# Patient Record
Sex: FEMALE | Race: WHITE | NOT HISPANIC OR LATINO | URBAN - METROPOLITAN AREA
[De-identification: names, ages, dates, MRNs, and addresses within clinical notes are randomized per-mention and may not be internally consistent; named-entity substitution may affect disease eponyms.]

---

## 2022-12-17 ENCOUNTER — EMERGENCY (EMERGENCY)
Facility: HOSPITAL | Age: 27
LOS: 0 days | Discharge: HOME | End: 2022-12-17
Attending: STUDENT IN AN ORGANIZED HEALTH CARE EDUCATION/TRAINING PROGRAM | Admitting: STUDENT IN AN ORGANIZED HEALTH CARE EDUCATION/TRAINING PROGRAM

## 2022-12-17 VITALS
RESPIRATION RATE: 18 BRPM | SYSTOLIC BLOOD PRESSURE: 122 MMHG | WEIGHT: 145.06 LBS | TEMPERATURE: 98 F | DIASTOLIC BLOOD PRESSURE: 80 MMHG | OXYGEN SATURATION: 99 % | HEART RATE: 74 BPM
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
## 2022-12-17 DIAGNOSIS — T31.0 BURNS INVOLVING LESS THAN 10% OF BODY SURFACE: ICD-10-CM
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
## 2022-12-17 DIAGNOSIS — T23.202A BURN OF SECOND DEGREE OF LEFT HAND, UNSPECIFIED SITE, INITIAL ENCOUNTER: ICD-10-CM

## 2022-12-17 PROCEDURE — 99284 EMERGENCY DEPT VISIT MOD MDM: CPT

## 2022-12-17 PROCEDURE — 99053 MED SERV 10PM-8AM 24 HR FAC: CPT

## 2022-12-17 RX ORDER — IBUPROFEN 200 MG
400 TABLET ORAL ONCE
Refills: 0 | Status: COMPLETED | OUTPATIENT
Start: 2022-12-17 | End: 2022-12-17

## 2022-12-17 RX ORDER — IBUPROFEN 200 MG
1 TABLET ORAL
Qty: 42 | Refills: 0
Start: 2022-12-17 | End: 2022-12-30

## 2022-12-17 RX ADMIN — Medication 400 MILLIGRAM(S): at 02:23

## 2022-12-17 NOTE — ED PROVIDER NOTE - CLINICAL SUMMARY MEDICAL DECISION MAKING FREE TEXT BOX
pt seen by gregor allan. burn will send ssd cream. burn does not recommend admission. burn states pt can f/u Tuesday.

## 2022-12-17 NOTE — ED PROVIDER NOTE - OBJECTIVE STATEMENT
26 yo f no pmh  pt presents for eval of L hand burn. 2 days ago, pt spilled boiling water to hand. pt has burn ~3 inches x1 inch wrapping 2nd digit carpal region dorsal around to little region. + blister. no fever/chills/redness. pt transferred from [facility] for burn eval. pt R hand dom. tdap given at OSH

## 2022-12-17 NOTE — ED ADULT NURSE NOTE - NSIMPLEMENTINTERV_GEN_ALL_ED
Implemented All Universal Safety Interventions:  [place] to call system. Call bell, personal items and telephone within reach. Instruct patient to call for assistance. Room bathroom lighting operational. Non-slip footwear when patient is off stretcher. Physically safe environment: no spills, clutter or unnecessary equipment. Stretcher in lowest position, wheels locked, appropriate side rails in place.

## 2022-12-17 NOTE — CONSULT NOTE ADULT - SUBJECTIVE AND OBJECTIVE BOX
Pt is 28 y/o female with no significant PMHx sent from [facility] ED for evaluation of left hand burn. Pt states  she works as  is [employer]. She was using steamer at her work anf accidentally got burn to her left hand two   days. Initially, she did not go to see a doctor but today noted with large blister and worsening pain and went to [place] ED for evaluation. And then pt transferred to [facility] ED to see BURN specialist. Pt denies fever, chills, cough, chest   pain, headache, abdominal pain, nausea, or vomiting.         Vital Signs Last 24 Hrs  T(C): 36.4 (17 Dec 2022 00:59), Max: 36.4 (17 Dec 2022 00:59)  T(F): 97.6 (17 Dec 2022 00:59), Max: 97.6 (17 Dec 2022 00:59)  HR: 74 (17 Dec 2022 00:59) (74 - 74)  BP: 122/80 (17 Dec 2022 00:59) (122/80 - 122/80)  RR: 18 (17 Dec 2022 00:59) (18 - 18)  SpO2: 99% (17 Dec 2022 00:59) (99% - 99%)      Allergies  No Known Allergies      PHYSICAL EXAM:  GENERAL: well built, well nourished, NAD, alert, oriented, cooperative  SKIN/Wound: left hand second degree burn with large 3inches x 1 inch blister full of fluid at base of 1st and second digit   extending to palmar surface. Pt able to ROM all fingers, no streaking redness, no signs of infection. Excisional   debridement of  blister deep to dermis done on bedside. Then dressing with silvadene, adaptic, and kelrix applied. Pt   tolerated well.  Pt is 26 y/o female with no significant PMHx sent from [facility] ED for evaluation of left hand burn. Pt states she works as  is [employer]. She was using steamer at her work anf accidentally got burn to her left hand two days. Initially, she did not go to see a doctor but today noted with large blister and worsening pain and went to [place] ED for evaluation. And then pt transferred to [facility] ED to see BURN specialist. Pt denies fever, chills, cough, chest pain, headache, abdominal pain, nausea, or vomiting     Vital Signs Last 24 Hrs  T(C): 36.4 (17 Dec 2022 00:59), Max: 36.4 (17 Dec 2022 00:59)  T(F): 97.6 (17 Dec 2022 00:59), Max: 97.6 (17 Dec 2022 00:59)  HR: 74 (17 Dec 2022 00:59) (74 - 74)  BP: 122/80 (17 Dec 2022 00:59) (122/80 - 122/80)  RR: 18 (17 Dec 2022 00:59) (18 - 18)  SpO2: 99% (17 Dec 2022 00:59) (99% - 99%)      Allergies  No Known Allergies      PHYSICAL EXAM:  GENERAL: well built, well nourished, NAD, alert, oriented, cooperative  SKIN/Wound: left hand second degree burn with large 3inches x 1 inch blister full of fluid at base of 1st and second digit   extending to palmar surface. Pt able to ROM all fingers, no streaking redness, no signs of infection. Excisional   debridement of  blister deep to dermis done on bedside. Then dressing with silvadene, adaptic, and kelrix applied. Pt   tolerated well.  Pt is 26 y/o female with no significant PMHx sent from [facility] ED for evaluation of left hand burn. Pt states she works as  is [employer]. She was using steamer at her work anf accidentally got burn to her left hand two days. Initially, she did not go to see a doctor but today noted with large blister and worsening pain and went to [place] ED for evaluation. And then pt transferred to [facility] ED to see BURN specialist. Pt denies fever, chills, cough, chest pain, headache, abdominal pain, nausea, or vomiting     Vital Signs Last 24 Hrs  T(C): 36.4 (17 Dec 2022 00:59), Max: 36.4 (17 Dec 2022 00:59)  T(F): 97.6 (17 Dec 2022 00:59), Max: 97.6 (17 Dec 2022 00:59)  HR: 74 (17 Dec 2022 00:59) (74 - 74)  BP: 122/80 (17 Dec 2022 00:59) (122/80 - 122/80)  RR: 18 (17 Dec 2022 00:59) (18 - 18)  SpO2: 99% (17 Dec 2022 00:59) (99% - 99%)      Allergies  No Known Allergies      PHYSICAL EXAM:  GENERAL: well built, well nourished, NAD, alert, oriented, cooperative  SKIN/Wound: left hand second degree burn with large 3inches x 1 inch blister full of fluid at base of 1st and second digit extending to palmar surface. Pt able to ROM all fingers, no streaking redness, no signs of infection. Excisional debridement of  blister deep to dermis done on bedside. Then dressing with silvadene, adaptic, and kelrix applied. Pt tolerated wel.  l.  Pt is 28 y/o female with no significant PMHx sent from [facility] ED for evaluation of left hand burn. 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 She was using steamer at her work anf accidentally got burn to her left hand two days. Initially, she did not go to see a doctor but today noted with large blister and worsening pain and went to [place] ED for evaluation. And then pt transferred to [facility] ED to see BURN specialist. Pt denies fever, chills, cough, chest pain, headache, abdominal pain, nausea, or vomiting     Vital Signs Last 24 Hrs  T(C): 36.4 (17 Dec 2022 00:59), Max: 36.4 (17 Dec 2022 00:59)  T(F): 97.6 (17 Dec 2022 00:59), Max: 97.6 (17 Dec 2022 00:59)  HR: 74 (17 Dec 2022 00:59) (74 - 74)  BP: 122/80 (17 Dec 2022 00:59) (122/80 - 122/80)  RR: 18 (17 Dec 2022 00:59) (18 - 18)  SpO2: 99% (17 Dec 2022 00:59) (99% - 99%)      Allergies  No Known Allergies      PHYSICAL EXAM:  GENERAL: well built, well nourished, NAD, alert, oriented, cooperative  SKIN/Wound: left hand second degree burn with large 3inches x 1 inch blister full of fluid at base of 1st and second digit extending to palmar surface. Pt able to ROM all fingers, no streaking redness, no signs of infection. Excisional debridement of  blister deep to dermis done on bedside. Then dressing with silvadene, adaptic, and kelrix applied. Pt tolerated well.

## 2022-12-17 NOTE — ED ADULT NURSE NOTE - CHIEF COMPLAINT QUOTE
Pt was sent from [facility] for burn eval to the left hand that happened on Wednesday by spilling boiling water.

## 2022-12-17 NOTE — ED PROVIDER NOTE - NSFOLLOWUPCLINICS_GEN_ALL_ED_FT
[facility]-Cardiac [facility] Lower Level  Burn  [address] [place] [address]  Phone: [phone]  Fax:   Scheduled Appointment: 12/20/2022

## 2022-12-17 NOTE — ED PROVIDER NOTE - PHYSICAL EXAMINATION
vss  gen- NAD, aaox3  card-rrr  lungs-ctab, no wheezing or rhonchi  neuro- full str/sensation, cn ii-xii grossly intact, normal coordination and gait  MSK- ~3 inches x1 inch wrapping 2nd digit carpal region dorsal around to little region, pt able to ROM all fingers, no streaking redness

## 2022-12-17 NOTE — ED PROVIDER NOTE - NS ED ROS FT
Constitutional: no fever  Cardiovascular: no chest pain  Respiratory:  no cough  Abdominal: no abdominal pain  Neurological: no altered mental status  MSK: + hand burn

## 2022-12-17 NOTE — ED ADULT TRIAGE NOTE - CHIEF COMPLAINT QUOTE
Pt was sent from [facility] for burn eval to the left hand that happened on Wednesday by spilling boiling water.

## 2022-12-17 NOTE — ED PROVIDER NOTE - PATIENT PORTAL LINK FT
You can access the FollowMyHealth Patient Portal offered by [facility] by registering at the following website: [email or web address]. By joining [facility]’s FollowMyHealth portal, you will also be able to view your health information using other applications (apps) compatible with our system.

## 2022-12-17 NOTE — CONSULT NOTE ADULT - ASSESSMENT
Pt is 28 y/o female with no significant PMHx sent from [facility] ED for evaluation of left hand burn,    # Second degree burn to left hand TBSA ~ 0.5%  - no signs of infection  - continue local wound care bid: wash wound with soap and water, apply silvadene cream, then cover with adaptic, and wrap with kelrix.  - take Tylenol or ibuprofen for pain as needed  - Please call [phone] to make a follow up appointment with Dr. Redding next week. Clinic is located at [address] [place] [address] in the Advanced Cardiac Building on Tuesdays 10am -11:30am; or at [address] [place] [address] on Tuesdays (2-4pm) or Thursdays (9am-1pm).

## 2022-12-17 NOTE — ED PROVIDER NOTE - NSFOLLOWUPINSTRUCTIONS_ED_ALL_ED_FT
Apply burn cream as discussed with burn team. Follow up Tuesday at burn clinic. Return for fevers, chills, increasing redness around burn, increasing swelling to hand, streaking redness going up wrist/arm, numbness to hand or other concerning symptoms.        Burn Care, Adult       A burn is an injury to the skin or the tissues under the skin that is caused by a fire, hot liquid, chemical, or electricity. There are three types of burns:  •First degree. These burns may cause the skin to be red and slightly swollen. These burns do not blister or scar.      •Second degree. These burns are very painful and cause the skin to be very red. The skin may also swell, leak fluid, look shiny, and develop blisters.      •Third degree. These burns cause permanent damage. They either turn the skin white or black and make it look charred, dry, and leathery. These burns may not be painful due to damage to the nerve endings.      Treatment for your burn will depend on the type of burn you have. Taking care of your burn properly can help to prevent pain and infection. It can also help the burn heal more quickly.      How to care for a first-degree burn    Right after a burn:     •Rinse or soak the burn under cool water for 5 minutes or more. Do not put ice on your burn. This can cause more damage.      •Apply a cool, clean, wet cloth (cool compress) to your skin. This may help with pain.      •Put lotion or gel with aloe vera on your skin. This may help soothe the burn.      Caring for the burn     Follow instructions from your health care provider about cleaning and caring for the burn. This may include:  •Using mild soap and water to clean the area.      •Using a clean cloth to pat the burned area dry after cleaning it. Do not rub or scrub the burn.      •Applying lotion or gel with aloe vera to your skin.        How to care for a second-degree burn    Right after a burn:     •Rinse or soak the burn under cool water. Do this for 5 to 10 minutes. Do not put ice on your burn. This can cause more damage.      •Remove any jewelry near the burned area.      •Lightly cover the burn with a clean cloth.      Caring for the burn     •Raise (elevate) the injured area above the level of your heart while sitting or lying down.    •Follow instructions from your health care provider about cleaning and caring for the burn. This may include:  •Cleaning or rinsing out (irrigating) the burned area.      •Putting a cream or ointment on the burn.      •Placing a germ-free (sterile) dressing over the burn. A dressing is a material that is placed over a burn to help it heal.          How to care for a third-degree burn    Right after a burn:     •Lightly cover the burn with a clean, dry cloth.    •Seek treatment right away if you have this kind of burn. You may:  •Require admission to the hospital.      •Be treated with surgery to remove damaged tissue or to place a skin graft to cover the damaged area.      •Be given IV fluids to keep you hydrated.        Caring for the burn     Follow instructions from your health care provider about cleaning and caring for the burn. This may include:  •Cleaning or rinsing out (irrigating) the burned area.      •Putting a cream or ointment on the burn.      •Placing a sterile dressing in the burned area (packing).      •Placing a sterile dressing over the burn.      Other instructions    •Elevate the injured area above the level of your heart while sitting or lying down.      •Wear splints or immobilizers as instructed by your health care provider.      •Rest as told by your health care provider. Do not participate in sports or other physical activities until your health care provider approves.        How to prevent infection when caring for a burn   •Take these steps to prevent infection:  •Wash your hands with soap and water for at least 20 seconds before and after burn care. If soap and water are not available, use hand .      •Wear clean or sterile gloves as directed by your health care provider.      •Do not put butter, oil, toothpaste, or other home remedies on the burn.      •Do not scratch or pick at the burn.      •Do not break any blisters.      •Do not peel the skin.      •Do not rub your burn, even when you are cleaning it.      •Check your burn every day for these signs of infection:  •More redness, swelling, or pain.      •Warmth.      •Pus or a bad smell.      •Red streaks around the burn.          Follow these instructions at home     Medicines     •Take over-the-counter and prescription medicines only as told by your health care provider.      •If you were prescribed an antibiotic medicine, take or apply it as told by your health care provider. Do not stop using the antibiotic even if your condition improves.      •Your health care provider may recommend taking over-the-counter or prescription pain medicine before changing your dressing.      General instructions     •Protect your burn from the sun.      •Drink enough fluid to keep your urine pale yellow.      • Do not use any products that contain nicotine or tobacco, such as cigarettes, e-cigarettes, and chewing tobacco. These can delay healing. If you need help quitting, ask your health care provider.      •Keep all follow-up visits as told by your health care provider. This is important.        Contact a health care provider if:    •Your condition does not improve.      •Your condition gets worse.      •You have a fever or chills.      •Your burn feels warm to the touch.      •You have more redness, swelling, or pain at the site of the burn.      •Your burn changes in appearance or develops black or red spots.      •You have pain that is not controlled with medicine.        Get help right away if you have:    •More fluid, blood, or pus coming from your burn.      •Red streaks near the burn.      •Severe pain.        Summary    •There are three types of burns. They are first degree, second degree, and third degree. The most severe type of burn is a third-degree burn which must be treated right away.      •Treatment for your burn will depend on the type of burn you have.      • Do not put butter, oil, toothpaste, or other home remedies on the burn. This can cause more damage to the tissue.      •Follow instructions from your health care provider about how to clean and take care of your burn.      This information is not intended to replace advice given to you by your health care provider. Make sure you discuss any questions you have with your health care provider.

## 2022-12-27 ENCOUNTER — APPOINTMENT (OUTPATIENT)
Dept: BURN CARE | Facility: CLINIC | Age: 27
End: 2022-12-27

## 2022-12-27 PROBLEM — Z00.00 ENCOUNTER FOR PREVENTIVE HEALTH EXAMINATION: Status: ACTIVE | Noted: 2022-12-27
